# Patient Record
Sex: FEMALE | Race: WHITE | NOT HISPANIC OR LATINO | Employment: OTHER | URBAN - METROPOLITAN AREA
[De-identification: names, ages, dates, MRNs, and addresses within clinical notes are randomized per-mention and may not be internally consistent; named-entity substitution may affect disease eponyms.]

---

## 2017-04-06 DIAGNOSIS — M25.512 LEFT SHOULDER PAIN, UNSPECIFIED CHRONICITY: Primary | ICD-10-CM

## 2017-04-07 ENCOUNTER — HOSPITAL ENCOUNTER (OUTPATIENT)
Dept: RADIOLOGY | Facility: HOSPITAL | Age: 67
Discharge: HOME | End: 2017-04-07
Attending: ORTHOPAEDIC SURGERY
Payer: MEDICARE

## 2017-04-07 ENCOUNTER — OFFICE VISIT (OUTPATIENT)
Dept: ORTHOPEDICS | Facility: CLINIC | Age: 67
End: 2017-04-07
Payer: MEDICARE

## 2017-04-07 VITALS
WEIGHT: 196.19 LBS | DIASTOLIC BLOOD PRESSURE: 75 MMHG | HEART RATE: 90 BPM | BODY MASS INDEX: 32.69 KG/M2 | SYSTOLIC BLOOD PRESSURE: 147 MMHG | HEIGHT: 65 IN

## 2017-04-07 DIAGNOSIS — S46.002A ROTATOR CUFF INJURY, LEFT, INITIAL ENCOUNTER: Primary | ICD-10-CM

## 2017-04-07 DIAGNOSIS — M25.512 LEFT SHOULDER PAIN, UNSPECIFIED CHRONICITY: ICD-10-CM

## 2017-04-07 DIAGNOSIS — M25.512 LEFT SHOULDER PAIN, UNSPECIFIED CHRONICITY: Primary | ICD-10-CM

## 2017-04-07 PROCEDURE — 99999 PR PBB SHADOW E&M-EST. PATIENT-LVL III: CPT | Mod: PBBFAC,,, | Performed by: ORTHOPAEDIC SURGERY

## 2017-04-07 PROCEDURE — 99203 OFFICE O/P NEW LOW 30 MIN: CPT | Mod: S$PBB,,, | Performed by: ORTHOPAEDIC SURGERY

## 2017-04-07 NOTE — PROGRESS NOTES
History reviewed. No pertinent past medical history.    History reviewed. No pertinent surgical history.    No current outpatient prescriptions on file.     No current facility-administered medications for this visit.        Review of patient's allergies indicates:  No Known Allergies    History reviewed. No pertinent family history.    Social History     Social History    Marital status:      Spouse name: N/A    Number of children: N/A    Years of education: N/A     Occupational History    Not on file.     Social History Main Topics    Smoking status: Never Smoker    Smokeless tobacco: Never Used    Alcohol use Not on file    Drug use: No    Sexual activity: Not on file     Other Topics Concern    Not on file     Social History Narrative    No narrative on file       Chief Complaint:   Chief Complaint   Patient presents with    Left Shoulder - Pain       Consulting Physician: Self, Aaareferral    History of present illness:    This is a 66 y.o. year old female who complains of left shoulder pain following an injury on 2/26/17 while pulling a picnic table.  She reports she did not feel a pop at the time but the next day her shoulder was very sore.  Since that point in time she is unable to do overhead activities.  She puts her pain is much as a 7 out of 10 and worse with activities.  She is right-hand dominant.  She had a previous right rotator cuff repair.  She had a steroid injection from her primary care which was helpful in relieving her pain.  She reports that she has had some numbness and tingling in the arm since the injury.  She has been doing physical therapy and states that is helping.    Review of Systems:    Constitution: Denies chills, fever, and sweats.  HENT: Denies headaches or blurry vision.  Cardiovascular: Denies chest pain or irregular heart beat.  Respiratory: Denies cough or shortness of breath.  Gastrointestinal: Denies abdominal pain, nausea, or vomiting.  Musculoskeletal:   "Denies muscle cramps.  Neurological: Denies dizziness or focal weakness.  Psychiatric/Behavioral: Normal mental status.  Hematologic/Lymphatic: Denies bleeding problem or easy bruising/bleeding.  Skin: Denies rash or suspicious lesions.    Examination:    Vital Signs:    Vitals:    04/07/17 1451   BP: (!) 147/75   Pulse: 90   Weight: 89 kg (196 lb 3.4 oz)   Height: 5' 5" (1.651 m)   PainSc: 0-No pain   PainLoc: Shoulder       Body mass index is 32.65 kg/(m^2).    This a well-developed, well nourished patient in no acute distress.    Alert and oriented and cooperative to examination.       Physical Exam: Left Shoulder Exam     Skin  Rash:   None  Scars:   None    Inspection/Observation   Swelling:   none  Erythema:   none  Bruising:   none  Wounds:   none  Scapular Winging:  none  Scapular Dyskinesia:  none  Atrophy:   none  Masses:   None  Lymphadenopathy: None    Tenderness   AC Joint:   mild    Range of Motion   Active Forward Flexion:  60  Active External Rotation:  30  Active Internal Rotation:  HP    Passive Forward Flexion:  160  Passive External Rotation:  45    Muscle Strength   Forward Flexion:  4/5  External Rotation:  4/5  Internal Rotation:  4/5    Tests & Signs   Cross Arm:   positive  Impingement:   positive    Other   Sensation:   normal  Pulse:    2+ radial          Imaging: X-rays ordered and reviewed today of the left shoulder show some degenerative changes of the acromioclavicular joint.        Assessment: Rotator cuff injury, left, initial encounter        Plan:  Based on her exam and is likely that she has a rotator cuff tear.  I like to obtain an MRI.  She'll likely require a cuff repair and probable distal clavicle excision.  In the meantime we will keep her in physical therapy.      DISCLAIMER: This note may have been dictated using voice recognition software and may contain grammatical errors.     NOTE: Consult report sent to referring provider via [facility] EMR.  "

## 2017-04-11 RX ORDER — LORAZEPAM 1 MG/1
1 TABLET ORAL DAILY
Qty: 2 TABLET | Refills: 0 | Status: SHIPPED | OUTPATIENT
Start: 2017-04-11 | End: 2017-05-11

## 2017-04-15 ENCOUNTER — HOSPITAL ENCOUNTER (OUTPATIENT)
Dept: RADIOLOGY | Facility: HOSPITAL | Age: 67
Discharge: HOME | End: 2017-04-15
Attending: ORTHOPAEDIC SURGERY
Payer: MEDICARE

## 2017-04-15 DIAGNOSIS — M25.512 LEFT SHOULDER PAIN, UNSPECIFIED CHRONICITY: ICD-10-CM

## 2017-04-15 PROCEDURE — 73221 MRI JOINT UPR EXTREM W/O DYE: CPT | Mod: 26,LT,, | Performed by: RADIOLOGY

## 2017-04-15 PROCEDURE — 73221 MRI JOINT UPR EXTREM W/O DYE: CPT | Mod: TC,LT

## 2017-04-17 ENCOUNTER — OFFICE VISIT (OUTPATIENT)
Dept: ORTHOPEDICS | Facility: CLINIC | Age: 67
End: 2017-04-17
Payer: MEDICARE

## 2017-04-17 VITALS
DIASTOLIC BLOOD PRESSURE: 90 MMHG | BODY MASS INDEX: 32.65 KG/M2 | HEART RATE: 86 BPM | SYSTOLIC BLOOD PRESSURE: 153 MMHG | HEIGHT: 65 IN | WEIGHT: 196 LBS

## 2017-04-17 DIAGNOSIS — S46.002D ROTATOR CUFF INJURY, LEFT, SUBSEQUENT ENCOUNTER: Primary | ICD-10-CM

## 2017-04-17 PROCEDURE — 99213 OFFICE O/P EST LOW 20 MIN: CPT | Mod: S$GLB,,, | Performed by: ORTHOPAEDIC SURGERY

## 2017-04-17 NOTE — PROGRESS NOTES
History reviewed. No pertinent past medical history.    History reviewed. No pertinent surgical history.    Current Outpatient Prescriptions   Medication Sig    lorazepam (ATIVAN) 1 MG tablet Take 1 tablet (1 mg total) by mouth once daily.     No current facility-administered medications for this visit.        Review of patient's allergies indicates:  No Known Allergies    History reviewed. No pertinent family history.    Social History     Social History    Marital status:      Spouse name: N/A    Number of children: N/A    Years of education: N/A     Occupational History    Not on file.     Social History Main Topics    Smoking status: Never Smoker    Smokeless tobacco: Never Used    Alcohol use Not on file    Drug use: No    Sexual activity: Not on file     Other Topics Concern    Not on file     Social History Narrative       Chief Complaint:   Chief Complaint   Patient presents with    Shoulder Injury     DOI 2/26/17 LEFT ROTATOR CUFF INJURY/ F/U MRI       Consulting Physician: No ref. provider found    History of present illness:    This is a 66 y.o. year old female who complains of left shoulder pain following an injury on 2/26/17 while pulling a picnic table.  She reports she did not feel a pop at the time but the next day her shoulder was very sore.  Since that point in time she is unable to do overhead activities.  She puts her pain at a 5/10 today.    Review of Systems:    Constitution: Denies chills, fever, and sweats.  HENT: Denies headaches or blurry vision.  Cardiovascular: Denies chest pain or irregular heart beat.  Respiratory: Denies cough or shortness of breath.  Gastrointestinal: Denies abdominal pain, nausea, or vomiting.  Musculoskeletal:  Denies muscle cramps.  Neurological: Denies dizziness or focal weakness.  Psychiatric/Behavioral: Normal mental status.  Hematologic/Lymphatic: Denies bleeding problem or easy bruising/bleeding.  Skin: Denies rash or suspicious  "lesions.    Examination:    Vital Signs:    Vitals:    04/17/17 1345   BP: (!) 153/90   Pulse: 86   Weight: 88.9 kg (196 lb)   Height: 5' 5" (1.651 m)   PainSc:   5   PainLoc: Shoulder       Body mass index is 32.62 kg/(m^2).    This a well-developed, well nourished patient in no acute distress.    Alert and oriented and cooperative to examination.       Physical Exam: Left Shoulder Exam     Skin  Rash:   None  Scars:   None    Inspection/Observation   Swelling:   none  Erythema:   none  Bruising:   none  Wounds:   none  Scapular Winging:  none  Scapular Dyskinesia:  none  Atrophy:   none  Masses:   None  Lymphadenopathy: None    Tenderness   AC Joint:   mild    Range of Motion   Active Forward Flexion:  60  Active External Rotation:  30  Active Internal Rotation:  HP    Passive Forward Flexion:  160  Passive External Rotation:  45    Muscle Strength   Forward Flexion:  4/5  External Rotation:  4/5  Internal Rotation:  4/5    Tests & Signs   Cross Arm:   positive  Impingement:   positive    Other   Sensation:   normal  Pulse:    2+ radial          Imaging: The MRI of her left shoulder reveals a small full-thickness/high-grade partial-thickness tear of the supraspinatus.     Assessment: left cuff tear    Plan: We discussed treatment options and at this point in time she would like to have this fixed however she would like to wait until August for the surgery.  In the meantime we'll get her set up for physical therapy.  We'll plan to see her back in July and get her set up for surgery on August 17.    DISCLAIMER: This note may have been dictated using voice recognition software and may contain grammatical errors.     NOTE: Consult report sent to referring provider via [facility] EMR.  "

## 2017-07-24 ENCOUNTER — OFFICE VISIT (OUTPATIENT)
Dept: ORTHOPEDICS | Facility: CLINIC | Age: 67
End: 2017-07-24
Payer: MEDICARE

## 2017-07-24 VITALS
SYSTOLIC BLOOD PRESSURE: 138 MMHG | DIASTOLIC BLOOD PRESSURE: 74 MMHG | WEIGHT: 196 LBS | HEIGHT: 65 IN | HEART RATE: 97 BPM | BODY MASS INDEX: 32.65 KG/M2

## 2017-07-24 DIAGNOSIS — S46.002D ROTATOR CUFF INJURY, LEFT, SUBSEQUENT ENCOUNTER: Primary | ICD-10-CM

## 2017-07-24 PROCEDURE — 1125F AMNT PAIN NOTED PAIN PRSNT: CPT | Mod: S$GLB,,, | Performed by: ORTHOPAEDIC SURGERY

## 2017-07-24 PROCEDURE — 99213 OFFICE O/P EST LOW 20 MIN: CPT | Mod: S$GLB,,, | Performed by: ORTHOPAEDIC SURGERY

## 2017-07-24 PROCEDURE — 1159F MED LIST DOCD IN RCRD: CPT | Mod: S$GLB,,, | Performed by: ORTHOPAEDIC SURGERY

## 2022-12-07 ENCOUNTER — OFFICE VISIT (OUTPATIENT)
Dept: URGENT CARE | Facility: CLINIC | Age: 72
End: 2022-12-07
Payer: MEDICARE

## 2022-12-07 VITALS
HEIGHT: 64 IN | HEART RATE: 59 BPM | OXYGEN SATURATION: 95 % | TEMPERATURE: 100 F | WEIGHT: 200 LBS | DIASTOLIC BLOOD PRESSURE: 62 MMHG | BODY MASS INDEX: 34.15 KG/M2 | SYSTOLIC BLOOD PRESSURE: 126 MMHG

## 2022-12-07 DIAGNOSIS — U07.1 COVID-19 VIRUS DETECTED: ICD-10-CM

## 2022-12-07 DIAGNOSIS — U07.1 COVID: Primary | ICD-10-CM

## 2022-12-07 DIAGNOSIS — R52 BODY ACHES: ICD-10-CM

## 2022-12-07 LAB
CTP QC/QA: YES
MOLECULAR STREP A: NEGATIVE
POC MOLECULAR INFLUENZA A AGN: NEGATIVE
POC MOLECULAR INFLUENZA B AGN: NEGATIVE
SARS-COV-2 RDRP RESP QL NAA+PROBE: POSITIVE

## 2022-12-07 PROCEDURE — 87651 STREP A DNA AMP PROBE: CPT | Mod: QW,S$GLB,, | Performed by: NURSE PRACTITIONER

## 2022-12-07 PROCEDURE — 87635 SARS-COV-2 COVID-19 AMP PRB: CPT | Mod: QW,CR,S$GLB, | Performed by: NURSE PRACTITIONER

## 2022-12-07 PROCEDURE — 99203 PR OFFICE/OUTPT VISIT, NEW, LEVL III, 30-44 MIN: ICD-10-PCS | Mod: CR,S$GLB,, | Performed by: NURSE PRACTITIONER

## 2022-12-07 PROCEDURE — 99203 OFFICE O/P NEW LOW 30 MIN: CPT | Mod: CR,S$GLB,, | Performed by: NURSE PRACTITIONER

## 2022-12-07 PROCEDURE — 87651 POCT STREP A MOLECULAR: ICD-10-PCS | Mod: QW,S$GLB,, | Performed by: NURSE PRACTITIONER

## 2022-12-07 PROCEDURE — 87502 POCT INFLUENZA A/B MOLECULAR: ICD-10-PCS | Mod: QW,S$GLB,, | Performed by: NURSE PRACTITIONER

## 2022-12-07 PROCEDURE — 87502 INFLUENZA DNA AMP PROBE: CPT | Mod: QW,S$GLB,, | Performed by: NURSE PRACTITIONER

## 2022-12-07 PROCEDURE — 87635: ICD-10-PCS | Mod: QW,CR,S$GLB, | Performed by: NURSE PRACTITIONER

## 2022-12-07 RX ORDER — PROMETHAZINE HYDROCHLORIDE AND DEXTROMETHORPHAN HYDROBROMIDE 6.25; 15 MG/5ML; MG/5ML
5 SYRUP ORAL
Qty: 118 ML | Refills: 0 | Status: SHIPPED | OUTPATIENT
Start: 2022-12-07 | End: 2022-12-17

## 2022-12-07 RX ORDER — AMIODARONE HYDROCHLORIDE 100 MG/1
TABLET ORAL DAILY
COMMUNITY

## 2022-12-07 NOTE — PROGRESS NOTES
"Subjective:       Patient ID: Cruz King is a 72 y.o. female.    Vitals:  height is 5' 4" (1.626 m) and weight is 90.7 kg (200 lb). Her oral temperature is 99.7 °F (37.6 °C). Her blood pressure is 126/62 and her pulse is 59 (abnormal). Her oxygen saturation is 95%.     Chief Complaint: Sore Throat    This is a 72 y.o. female who presents today with a chief complaint of sore throat and body aches since yesterday.    Sore Throat   This is a new problem. The current episode started in the past 7 days. The problem has been gradually worsening. Associated symptoms include congestion, coughing, ear pain, headaches, a hoarse voice, a plugged ear sensation, shortness of breath, swollen glands and trouble swallowing. Pertinent negatives include no vomiting. Treatments tried: theraflu. The treatment provided no relief.     HENT:  Positive for ear pain, congestion, sore throat and trouble swallowing.    Respiratory:  Positive for cough and shortness of breath.    Gastrointestinal:  Negative for vomiting.   Neurological:  Positive for headaches.     Objective:      Physical Exam   Constitutional: She is cooperative. normal  HENT:   Head: Normocephalic and atraumatic.   Ears:   Right Ear: Hearing, tympanic membrane, external ear and ear canal normal.   Left Ear: Hearing, tympanic membrane, external ear and ear canal normal.   Nose: Nose normal.   Mouth/Throat: Uvula is midline, oropharynx is clear and moist and mucous membranes are normal. Mucous membranes are moist. Oropharynx is clear.   Eyes: Conjunctivae and lids are normal. Pupils are equal, round, and reactive to light. Extraocular movement intact   Neck: Neck supple.   Cardiovascular: Normal rate, regular rhythm, normal heart sounds and normal pulses.   Pulmonary/Chest: Effort normal and breath sounds normal.   Abdominal: Normal appearance.   Musculoskeletal: Normal range of motion.         General: Normal range of motion.   Lymphadenopathy:     She has no " cervical adenopathy.   Neurological: She is alert.   Skin: Skin is warm, dry and intact.   Psychiatric: Her behavior is normal. Mood normal.       Results for orders placed or performed in visit on 12/07/22   POCT Strep A, Molecular   Result Value Ref Range    Molecular Strep A, POC Negative Negative     Acceptable Yes    POCT Influenza A/B MOLECULAR   Result Value Ref Range    POC Molecular Influenza A Ag Negative Negative, Not Reported    POC Molecular Influenza B Ag Negative Negative, Not Reported     Acceptable Yes    POCT COVID-19 Rapid Screening   Result Value Ref Range    POC Rapid COVID Positive (A) Negative     Acceptable Yes     No results found.     Assessment:       1. COVID    2. Body aches          Plan:         COVID    Body aches  -     POCT Strep A, Molecular  -     POCT Influenza A/B MOLECULAR  -     POCT COVID-19 Rapid Screening

## 2022-12-12 ENCOUNTER — NURSE TRIAGE (OUTPATIENT)
Dept: ADMINISTRATIVE | Facility: CLINIC | Age: 72
End: 2022-12-12
Payer: COMMERCIAL

## 2022-12-12 NOTE — TELEPHONE ENCOUNTER
Pt contacted through Covid Symptom tracking for an escalation of symptoms. Pt states she tested positive 5 days ago and she is still positive. Pt advised repeat testing is not generally recommended as people can continue to test positive up to 90 days. Advised it is recommended to follow the CDC guidelines and progress through stages if criteria are met. Pt aslo asking what she and her  should do about requiring masks at [facility]. Advised to make community aware that there has been positives among the [employer] and either require masks if they want or recommend their usage and perhaps minimize hand shaking. 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 Advised ok to send out mail as CV does not live long on surfaces- recommend using wet paper towel instead of licking envelopes. Pt has no additional concerns or questions at this time. Advised to call back with concerns or worsening symptoms. Verbalized understanding.   Reason for Disposition   Information only question and nurse able to answer    Additional Information   Negative: Nursing judgment   Negative: Nursing judgment   Negative: Nursing judgment   Negative: Nursing judgment    Protocols used: Information Only Call - No Triage-A-[place]

## 2023-09-18 ENCOUNTER — OFFICE VISIT (OUTPATIENT)
Dept: URGENT CARE | Facility: CLINIC | Age: 73
End: 2023-09-18
Payer: MEDICARE

## 2023-09-18 VITALS
RESPIRATION RATE: 19 BRPM | SYSTOLIC BLOOD PRESSURE: 138 MMHG | OXYGEN SATURATION: 97 % | HEART RATE: 74 BPM | DIASTOLIC BLOOD PRESSURE: 67 MMHG | WEIGHT: 195 LBS | TEMPERATURE: 99 F | HEIGHT: 64 IN | BODY MASS INDEX: 33.29 KG/M2

## 2023-09-18 DIAGNOSIS — B96.89 ACUTE BACTERIAL SINUSITIS: Primary | ICD-10-CM

## 2023-09-18 DIAGNOSIS — J01.90 ACUTE BACTERIAL SINUSITIS: Primary | ICD-10-CM

## 2023-09-18 DIAGNOSIS — R05.9 COUGH, UNSPECIFIED TYPE: ICD-10-CM

## 2023-09-18 LAB
CTP QC/QA: YES
SARS-COV-2 AG RESP QL IA.RAPID: NEGATIVE

## 2023-09-18 PROCEDURE — 99213 PR OFFICE/OUTPT VISIT, EST, LEVL III, 20-29 MIN: ICD-10-PCS | Mod: S$GLB,,, | Performed by: NURSE PRACTITIONER

## 2023-09-18 PROCEDURE — 87811 SARS-COV-2 COVID19 W/OPTIC: CPT | Mod: QW,S$GLB,, | Performed by: NURSE PRACTITIONER

## 2023-09-18 PROCEDURE — 99213 OFFICE O/P EST LOW 20 MIN: CPT | Mod: S$GLB,,, | Performed by: NURSE PRACTITIONER

## 2023-09-18 PROCEDURE — 87811 SARS CORONAVIRUS 2 ANTIGEN POCT, MANUAL READ: ICD-10-PCS | Mod: QW,S$GLB,, | Performed by: NURSE PRACTITIONER

## 2023-09-18 RX ORDER — PROMETHAZINE HYDROCHLORIDE AND DEXTROMETHORPHAN HYDROBROMIDE 6.25; 15 MG/5ML; MG/5ML
5 SYRUP ORAL EVERY 6 HOURS PRN
Qty: 120 ML | Refills: 0 | Status: SHIPPED | OUTPATIENT
Start: 2023-09-18 | End: 2023-09-28

## 2023-09-18 RX ORDER — LOSARTAN POTASSIUM 25 MG/1
25 TABLET ORAL
COMMUNITY
Start: 2023-08-03

## 2023-09-18 RX ORDER — CEFDINIR 300 MG/1
300 CAPSULE ORAL 2 TIMES DAILY
Qty: 20 CAPSULE | Refills: 0 | Status: SHIPPED | OUTPATIENT
Start: 2023-09-18 | End: 2023-09-28

## 2023-09-18 NOTE — PROGRESS NOTES
"Subjective:       Patient ID: Cruz King is a 72 y.o. female.    Vitals:  height is 5' 4" (1.626 m) and weight is 88.5 kg (195 lb). Her oral temperature is 99 °F (37.2 °C). Her blood pressure is 138/67 and her pulse is 74. Her respiration is 19 and oxygen saturation is 97%.     Chief Complaint: Cough (Sore throat, cough, and body aches since yesterday. Patient stated that her ribs hurt from constantly coughing.)    This is a 72 y.o. female who presents today with a chief complaint of cough.     Patient presents with:  Cough: Sore throat, cough, nasal congestion and body aches over the past two days. Pt reports a worsening over the past 24 hours with increase in sinus pain. Patient denied having fever or SOB.         Cough  This is a new problem. The current episode started yesterday. The problem has been gradually worsening. The cough is Productive of sputum. Associated symptoms include chills, nasal congestion, postnasal drip, a sore throat, sweats and wheezing. Pertinent negatives include no shortness of breath. She has tried OTC cough suppressant for the symptoms. The treatment provided no relief. Her past medical history is significant for bronchitis.       Constitution: Positive for chills.   HENT:  Positive for postnasal drip, sinus pain, sinus pressure and sore throat.    Respiratory:  Positive for cough and wheezing. Negative for shortness of breath.    Musculoskeletal: Negative.    Neurological:  Negative for disorientation and altered mental status.   Psychiatric/Behavioral:  Negative for altered mental status, disorientation and confusion.            Objective:      Physical Exam   Constitutional: She is oriented to person, place, and time. She appears well-developed. She is cooperative.  Non-toxic appearance. She does not appear ill. No distress.   HENT:   Head: Normocephalic and atraumatic.   Ears:   Right Ear: Hearing, tympanic membrane, external ear and ear canal normal.   Left Ear: Hearing, " tympanic membrane, external ear and ear canal normal.   Nose: No mucosal edema, rhinorrhea or nasal deformity. No epistaxis. Right sinus exhibits maxillary sinus tenderness. Right sinus exhibits no frontal sinus tenderness. Left sinus exhibits maxillary sinus tenderness. Left sinus exhibits no frontal sinus tenderness.   Mouth/Throat: Uvula is midline and mucous membranes are normal. No trismus in the jaw. Normal dentition. No uvula swelling. Posterior oropharyngeal erythema present. No oropharyngeal exudate or posterior oropharyngeal edema.   Eyes: Conjunctivae and lids are normal. No scleral icterus.   Neck: Trachea normal and phonation normal. Neck supple. No edema present. No erythema present. No neck rigidity present.   Cardiovascular: Normal rate, regular rhythm, normal heart sounds and normal pulses.   Pulmonary/Chest: Effort normal and breath sounds normal. No respiratory distress. She has no decreased breath sounds. She has no rhonchi.   Abdominal: Normal appearance.   Musculoskeletal: Normal range of motion.         General: No deformity. Normal range of motion.   Lymphadenopathy:     She has cervical adenopathy.        Right cervical: Superficial cervical adenopathy present.        Left cervical: Superficial cervical adenopathy present.   Neurological: She is alert and oriented to person, place, and time. She exhibits normal muscle tone. Coordination normal.   Skin: Skin is warm, dry, intact, not diaphoretic and not pale.   Psychiatric: Her speech is normal and behavior is normal. Judgment and thought content normal.   Nursing note and vitals reviewed.        Past medical history and current medications reviewed.     Results for orders placed or performed in visit on 09/18/23   SARS Coronavirus 2 Antigen, POCT Manual Read   Result Value Ref Range    SARS Coronavirus 2 Antigen Negative Negative     Acceptable Yes       Assessment:           1. Acute bacterial sinusitis    2. Cough,  unspecified type              Plan:         Acute bacterial sinusitis  -     cefdinir (OMNICEF) 300 MG capsule; Take 1 capsule (300 mg total) by mouth 2 (two) times daily. for 10 days  Dispense: 20 capsule; Refill: 0    Cough, unspecified type  -     SARS Coronavirus 2 Antigen, POCT Manual Read  -     promethazine-dextromethorphan (PROMETHAZINE-DM) 6.25-15 mg/5 mL Syrp; Take 5 mLs by mouth every 6 (six) hours as needed (cough).  Dispense: 120 mL; Refill: 0             Patient Instructions   Please return here or go to the Emergency Department for any concerns or worsening of condition.  Please drink plenty of fluids.  Please get plenty of rest.  If you were prescribed antibiotics, please take them to completion.  If you were given wait & see antibiotics, please wait 5-7 days before taking them, and only take them if your symptoms have worsened or not improved.  If you do begin taking the antibiotics, please take them to completion.  If you were given a steroid shot in the clinic and have also been given a prescription for a steroid such as Prednisone or a Medrol Dose Pack, please begin taking them tomorrow.  If you do not have Hypertension or any history of palpitations, it is ok to take over the counter Sudafed or Mucinex D or Allegra-D or Claritin-D or Zyrtec-D.  If you do take one of the above, it is ok to combine that with plain over the counter Mucinex or Allegra or Claritin or Zyrtec.  If for example you are taking Zyrtec -D, you can combine that with Mucinex, but not Mucinex-D.  If you are taking Mucinex-D, you can combine that with plain Allegra or Claritin or Zyrtec.   If you do have Hypertension or palpitations, it is safe to take Coricidin HBP for relief of sinus symptoms.  If not allergic, please take over the counter Tylenol (Acetaminophen) and/or Motrin (Ibuprofen) as directed for control of pain and/or fever.  Please follow up with your primary care doctor or specialist as needed.    If you  smoke,  please stop smoking.         Giuseppe Hunt, J LUISP-C

## 2024-11-13 ENCOUNTER — OFFICE VISIT (OUTPATIENT)
Dept: URGENT CARE | Facility: CLINIC | Age: 74
End: 2024-11-13
Payer: MEDICARE

## 2024-11-13 VITALS
HEART RATE: 95 BPM | OXYGEN SATURATION: 97 % | RESPIRATION RATE: 17 BRPM | DIASTOLIC BLOOD PRESSURE: 86 MMHG | WEIGHT: 200 LBS | SYSTOLIC BLOOD PRESSURE: 126 MMHG | BODY MASS INDEX: 34.15 KG/M2 | TEMPERATURE: 98 F | HEIGHT: 64 IN

## 2024-11-13 DIAGNOSIS — R05.9 COUGH, UNSPECIFIED TYPE: ICD-10-CM

## 2024-11-13 DIAGNOSIS — J98.8 VIRAL RESPIRATORY ILLNESS: Primary | ICD-10-CM

## 2024-11-13 DIAGNOSIS — B97.89 VIRAL RESPIRATORY ILLNESS: Primary | ICD-10-CM

## 2024-11-13 PROCEDURE — 99214 OFFICE O/P EST MOD 30 MIN: CPT | Mod: S$GLB,,,

## 2024-11-13 RX ORDER — TRAZODONE HYDROCHLORIDE 50 MG/1
25 TABLET ORAL
COMMUNITY
Start: 2023-07-06

## 2024-11-13 RX ORDER — CELECOXIB 200 MG/1
200 CAPSULE ORAL
COMMUNITY

## 2024-11-13 RX ORDER — SEMAGLUTIDE 1.34 MG/ML
INJECTION, SOLUTION SUBCUTANEOUS
COMMUNITY

## 2024-11-13 RX ORDER — PROMETHAZINE HYDROCHLORIDE AND DEXTROMETHORPHAN HYDROBROMIDE 6.25; 15 MG/5ML; MG/5ML
5 SYRUP ORAL EVERY 6 HOURS PRN
Qty: 120 ML | Refills: 0 | Status: SHIPPED | OUTPATIENT
Start: 2024-11-13

## 2024-11-13 RX ORDER — MIRABEGRON 25 MG/1
25 TABLET, FILM COATED, EXTENDED RELEASE ORAL
COMMUNITY

## 2024-11-13 RX ORDER — LANSOPRAZOLE 30 MG/1
30 CAPSULE, DELAYED RELEASE ORAL
COMMUNITY
Start: 2023-12-03

## 2024-11-13 RX ORDER — FLUTICASONE PROPIONATE 50 MCG
1 SPRAY, SUSPENSION (ML) NASAL DAILY
Qty: 9.9 ML | Refills: 0 | Status: SHIPPED | OUTPATIENT
Start: 2024-11-13

## 2024-11-13 RX ORDER — BENZONATATE 100 MG/1
100 CAPSULE ORAL 3 TIMES DAILY PRN
Qty: 30 CAPSULE | Refills: 0 | Status: SHIPPED | OUTPATIENT
Start: 2024-11-13 | End: 2024-11-23

## 2024-11-13 RX ORDER — AMIODARONE HYDROCHLORIDE 200 MG/1
TABLET ORAL
COMMUNITY
Start: 2022-10-17

## 2024-11-13 RX ORDER — FUROSEMIDE 20 MG/1
20 TABLET ORAL
COMMUNITY

## 2024-11-13 RX ORDER — VALSARTAN 40 MG/1
40 TABLET ORAL
COMMUNITY
Start: 2024-10-10

## 2024-11-13 RX ORDER — DESMOPRESSIN ACETATE 0.2 MG/1
0.2 TABLET ORAL
COMMUNITY
Start: 2024-02-15

## 2024-11-13 NOTE — PROGRESS NOTES
"Subjective:      Patient ID: Cruz King is a 74 y.o. female.    Vitals:  height is 5' 4" (1.626 m) and weight is 90.7 kg (200 lb). Her oral temperature is 98.2 °F (36.8 °C). Her blood pressure is 162/70 (abnormal) and her pulse is 95. Her respiration is 17 and oxygen saturation is 97%.     Chief Complaint: Cough    This is a 74 y.o. female who presents today with a chief complaint of cough and sore throat x yesterday. Patient states she did an at-home COVID test this morning, and it was negative. Patient states she is going out of town today to [place], and she wants to get medications before going. Pt denies any chest pain or sob. Pt states that she has not been running any fever.         Cough  This is a new problem. The current episode started yesterday. The problem has been waxing and waning. The cough is Productive of sputum. Associated symptoms include headaches, nasal congestion, postnasal drip and a sore throat. Pertinent negatives include no fever. The symptoms are aggravated by lying down. Treatments tried: Chloraseptic. The treatment provided no relief. Her past medical history is significant for bronchitis.       Constitution: Negative. Negative for fever.   HENT:  Positive for congestion, postnasal drip, sinus pain, sinus pressure and sore throat.    Neck: neck negative.   Cardiovascular: Negative.    Eyes: Negative.    Respiratory:  Positive for cough.    Gastrointestinal: Negative.    Endocrine: negative.   Genitourinary: Negative.    Musculoskeletal: Negative.    Skin: Negative.    Allergic/Immunologic: Negative.    Neurological:  Positive for headaches.   Hematologic/Lymphatic: Negative.       Objective:     Physical Exam   Constitutional: She is oriented to person, place, and time.   HENT:   Head: Normocephalic and atraumatic.   Ears:   Right Ear: Tympanic membrane, external ear and ear canal normal.   Left Ear: Tympanic membrane, external ear and ear canal normal.   Nose: Congestion " present.   Mouth/Throat: Posterior oropharyngeal erythema present.   Eyes: Conjunctivae are normal. Pupils are equal, round, and reactive to light. Extraocular movement intact   Neck: Neck supple.   Cardiovascular: Normal rate, regular rhythm, normal heart sounds and normal pulses.   Pulmonary/Chest: Effort normal and breath sounds normal.   Abdominal: Normal appearance.   Musculoskeletal: Normal range of motion.         General: Normal range of motion.   Neurological: no focal deficit. She is alert, oriented to person, place, and time and at baseline.   Skin: Skin is warm.   Psychiatric: Her behavior is normal. Mood, judgment and thought content normal.   Nursing note and vitals reviewed.      Assessment:     1. Viral respiratory illness    2. Cough, unspecified type        Plan:       Viral respiratory illness  -     fluticasone propionate (FLONASE) 50 mcg/actuation nasal spray; 1 spray (50 mcg total) by Each Nostril route once daily.  Dispense: 9.9 mL; Refill: 0  -     promethazine-dextromethorphan (PROMETHAZINE-DM) 6.25-15 mg/5 mL Syrp; Take 5 mLs by mouth every 6 (six) hours as needed (cough).  Dispense: 120 mL; Refill: 0  -     benzonatate (TESSALON) 100 MG capsule; Take 1 capsule (100 mg total) by mouth 3 (three) times daily as needed for Cough.  Dispense: 30 capsule; Refill: 0    Cough, unspecified type  -     fluticasone propionate (FLONASE) 50 mcg/actuation nasal spray; 1 spray (50 mcg total) by Each Nostril route once daily.  Dispense: 9.9 mL; Refill: 0  -     promethazine-dextromethorphan (PROMETHAZINE-DM) 6.25-15 mg/5 mL Syrp; Take 5 mLs by mouth every 6 (six) hours as needed (cough).  Dispense: 120 mL; Refill: 0  -     benzonatate (TESSALON) 100 MG capsule; Take 1 capsule (100 mg total) by mouth 3 (three) times daily as needed for Cough.  Dispense: 30 capsule; Refill: 0

## 2024-12-24 ENCOUNTER — OFFICE VISIT (OUTPATIENT)
Dept: URGENT CARE | Facility: CLINIC | Age: 74
End: 2024-12-24
Payer: MEDICARE

## 2024-12-24 VITALS
SYSTOLIC BLOOD PRESSURE: 152 MMHG | OXYGEN SATURATION: 98 % | HEART RATE: 99 BPM | HEIGHT: 64 IN | TEMPERATURE: 99 F | RESPIRATION RATE: 17 BRPM | BODY MASS INDEX: 34.15 KG/M2 | DIASTOLIC BLOOD PRESSURE: 84 MMHG | WEIGHT: 200 LBS

## 2024-12-24 DIAGNOSIS — J32.9 BACTERIAL SINUSITIS: Primary | ICD-10-CM

## 2024-12-24 DIAGNOSIS — R05.9 COUGH, UNSPECIFIED TYPE: ICD-10-CM

## 2024-12-24 DIAGNOSIS — B96.89 BACTERIAL SINUSITIS: Primary | ICD-10-CM

## 2024-12-24 LAB
CTP QC/QA: YES
CTP QC/QA: YES
POC MOLECULAR INFLUENZA A AGN: NEGATIVE
POC MOLECULAR INFLUENZA B AGN: NEGATIVE
SARS-COV-2 AG RESP QL IA.RAPID: NEGATIVE

## 2024-12-24 PROCEDURE — 87502 INFLUENZA DNA AMP PROBE: CPT | Mod: QW,,, | Performed by: NURSE PRACTITIONER

## 2024-12-24 PROCEDURE — 87811 SARS-COV-2 COVID19 W/OPTIC: CPT | Mod: QW,S$GLB,, | Performed by: NURSE PRACTITIONER

## 2024-12-24 PROCEDURE — 99214 OFFICE O/P EST MOD 30 MIN: CPT | Mod: S$GLB,,, | Performed by: NURSE PRACTITIONER

## 2024-12-24 RX ORDER — PROMETHAZINE HYDROCHLORIDE AND DEXTROMETHORPHAN HYDROBROMIDE 6.25; 15 MG/5ML; MG/5ML
5 SYRUP ORAL EVERY 4 HOURS PRN
Qty: 118 ML | Refills: 0 | Status: SHIPPED | OUTPATIENT
Start: 2024-12-24 | End: 2025-01-03

## 2024-12-24 RX ORDER — AMOXICILLIN 500 MG/1
500 CAPSULE ORAL EVERY 8 HOURS
Qty: 30 CAPSULE | Refills: 0 | Status: SHIPPED | OUTPATIENT
Start: 2024-12-24 | End: 2025-01-03

## 2024-12-24 NOTE — PROGRESS NOTES
"Subjective:      Patient ID: Cruz King is a 74 y.o. female.    Vitals:  height is 5' 4" (1.626 m) and weight is 90.7 kg (200 lb). Her oral temperature is 98.8 °F (37.1 °C). Her blood pressure is 152/84 (abnormal) and her pulse is 99. Her respiration is 17 and oxygen saturation is 98%.     Chief Complaint: Cough    74 y.o. female who presents today with a chief complaint of cough, sore throat, sinus pressure/congestion, and chest congestion starting last night. Pt has not taken any medication.        Cough  This is a new problem. The current episode started yesterday. The problem has been unchanged. The cough is Productive of sputum. Associated symptoms include nasal congestion, postnasal drip and a sore throat. She has tried nothing for the symptoms.       HENT:  Positive for postnasal drip, sinus pressure and sore throat.    Respiratory:  Positive for cough.       Objective:     Physical Exam   Constitutional: She is oriented to person, place, and time.  Non-toxic appearance. She does not appear ill. No distress. obesity  HENT:   Head: Normocephalic and atraumatic.   Ears:   Right Ear: Tympanic membrane, external ear and ear canal normal.   Left Ear: Tympanic membrane, external ear and ear canal normal.   Nose: Congestion present. Right sinus exhibits maxillary sinus tenderness. Left sinus exhibits maxillary sinus tenderness.   Mouth/Throat: Mucous membranes are moist. No posterior oropharyngeal erythema. Oropharynx is clear.   Eyes: Conjunctivae are normal. Extraocular movement intact   Neck: Neck supple. No neck rigidity present.   Cardiovascular: Normal rate, regular rhythm, normal heart sounds and normal pulses.   Pulmonary/Chest: Effort normal and breath sounds normal.   Abdominal: Normal appearance.   Musculoskeletal: Normal range of motion.         General: Normal range of motion.      Cervical back: She exhibits no tenderness.   Lymphadenopathy:     She has no cervical adenopathy.   Neurological: " She is alert and oriented to person, place, and time.   Skin: Skin is warm, dry and not diaphoretic.   Psychiatric: Her behavior is normal.   Vitals reviewed.    Results for orders placed or performed in visit on 12/24/24   SARS Coronavirus 2 Antigen, POCT Manual Read    Collection Time: 12/24/24  8:56 AM   Result Value Ref Range    SARS Coronavirus 2 Antigen Negative Negative     Acceptable Yes    POCT Influenza A/B MOLECULAR    Collection Time: 12/24/24  8:56 AM   Result Value Ref Range    POC Molecular Influenza A Ag Negative Negative    POC Molecular Influenza B Ag Negative Negative     Acceptable Yes     No results found.     Assessment:     1. Bacterial sinusitis    2. Cough, unspecified type        Plan:       Bacterial sinusitis  -     amoxicillin (AMOXIL) 500 MG capsule; Take 1 capsule (500 mg total) by mouth every 8 (eight) hours. for 10 days  Dispense: 30 capsule; Refill: 0    Cough, unspecified type  -     SARS Coronavirus 2 Antigen, POCT Manual Read  -     POCT Influenza A/B MOLECULAR  -     promethazine-dextromethorphan (PROMETHAZINE-DM) 6.25-15 mg/5 mL Syrp; Take 5 mLs by mouth every 4 (four) hours as needed (cough).  Dispense: 118 mL; Refill: 0      INSTRUCTIONS  Meds as prescribed. Follow up as advised.

## 2025-03-08 ENCOUNTER — OFFICE VISIT (OUTPATIENT)
Dept: URGENT CARE | Facility: CLINIC | Age: 75
End: 2025-03-08
Payer: MEDICARE

## 2025-03-08 VITALS
DIASTOLIC BLOOD PRESSURE: 78 MMHG | SYSTOLIC BLOOD PRESSURE: 147 MMHG | OXYGEN SATURATION: 97 % | HEART RATE: 65 BPM | TEMPERATURE: 99 F | BODY MASS INDEX: 35.68 KG/M2 | HEIGHT: 64 IN | RESPIRATION RATE: 18 BRPM | WEIGHT: 209 LBS

## 2025-03-08 DIAGNOSIS — J98.8 VIRAL RESPIRATORY ILLNESS: Primary | ICD-10-CM

## 2025-03-08 DIAGNOSIS — J02.9 SORE THROAT: ICD-10-CM

## 2025-03-08 DIAGNOSIS — B97.89 VIRAL RESPIRATORY ILLNESS: Primary | ICD-10-CM

## 2025-03-08 DIAGNOSIS — R09.81 NASAL CONGESTION: ICD-10-CM

## 2025-03-08 DIAGNOSIS — R05.9 COUGH, UNSPECIFIED TYPE: ICD-10-CM

## 2025-03-08 PROBLEM — B00.1 HERPES LABIALIS: Status: ACTIVE | Noted: 2025-03-08

## 2025-03-08 PROBLEM — I48.0 PAROXYSMAL ATRIAL FIBRILLATION: Status: ACTIVE | Noted: 2025-03-08

## 2025-03-08 PROBLEM — E78.2 MIXED HYPERLIPIDEMIA: Status: ACTIVE | Noted: 2025-03-08

## 2025-03-08 PROBLEM — I10 HYPERTENSION: Status: ACTIVE | Noted: 2025-03-08

## 2025-03-08 PROBLEM — M54.16 LUMBAR RADICULOPATHY: Status: ACTIVE | Noted: 2025-03-08

## 2025-03-08 PROBLEM — K21.9 GASTROESOPHAGEAL REFLUX DISEASE: Status: ACTIVE | Noted: 2025-03-08

## 2025-03-08 PROBLEM — Z87.19 H/O CHRONIC HEPATITIS: Status: ACTIVE | Noted: 2025-03-08

## 2025-03-08 PROBLEM — E66.812 CLASS 2 SEVERE OBESITY DUE TO EXCESS CALORIES WITH SERIOUS COMORBIDITY AND BODY MASS INDEX (BMI) OF 36.0 TO 36.9 IN ADULT: Status: ACTIVE | Noted: 2025-03-08

## 2025-03-08 PROBLEM — A60.04 HERPETIC VULVOVAGINITIS: Status: ACTIVE | Noted: 2025-03-08

## 2025-03-08 PROBLEM — K58.0 IRRITABLE BOWEL SYNDROME WITH DIARRHEA: Status: ACTIVE | Noted: 2025-03-08

## 2025-03-08 PROBLEM — K64.0 GRADE I HEMORRHOIDS: Status: ACTIVE | Noted: 2025-03-08

## 2025-03-08 PROBLEM — R35.0 INCREASED FREQUENCY OF URINATION: Status: ACTIVE | Noted: 2025-03-08

## 2025-03-08 PROBLEM — Z86.0101 HISTORY OF ADENOMATOUS POLYP OF COLON: Status: ACTIVE | Noted: 2025-03-08

## 2025-03-08 PROBLEM — E11.9 TYPE 2 DIABETES MELLITUS: Status: ACTIVE | Noted: 2025-03-08

## 2025-03-08 PROBLEM — E66.01 CLASS 2 SEVERE OBESITY DUE TO EXCESS CALORIES WITH SERIOUS COMORBIDITY AND BODY MASS INDEX (BMI) OF 36.0 TO 36.9 IN ADULT: Status: ACTIVE | Noted: 2025-03-08

## 2025-03-08 PROBLEM — G47.33 OBSTRUCTIVE SLEEP APNEA SYNDROME: Status: ACTIVE | Noted: 2025-03-08

## 2025-03-08 LAB
CTP QC/QA: YES
MOLECULAR STREP A: NEGATIVE
POC MOLECULAR INFLUENZA A AGN: NEGATIVE
POC MOLECULAR INFLUENZA B AGN: NEGATIVE
SARS CORONAVIRUS 2 ANTIGEN: NEGATIVE

## 2025-03-08 PROCEDURE — 87811 SARS-COV-2 COVID19 W/OPTIC: CPT | Mod: QW,S$GLB,,

## 2025-03-08 PROCEDURE — 99214 OFFICE O/P EST MOD 30 MIN: CPT | Mod: S$GLB,,,

## 2025-03-08 PROCEDURE — 87651 STREP A DNA AMP PROBE: CPT | Mod: QW,,,

## 2025-03-08 PROCEDURE — 87502 INFLUENZA DNA AMP PROBE: CPT | Mod: QW,,,

## 2025-03-08 RX ORDER — SODIUM FLUORIDE 5 MG/G
CREAM DENTAL
COMMUNITY
Start: 2025-02-07

## 2025-03-08 RX ORDER — ACYCLOVIR 50 MG/G
OINTMENT TOPICAL
COMMUNITY
Start: 2024-11-27 | End: 2025-11-27

## 2025-03-08 RX ORDER — GABAPENTIN 100 MG/1
100 CAPSULE ORAL
COMMUNITY
Start: 2025-01-24

## 2025-03-08 RX ORDER — IPRATROPIUM BROMIDE 21 UG/1
2 SPRAY, METERED NASAL 2 TIMES DAILY
Qty: 30 ML | Refills: 0 | Status: SHIPPED | OUTPATIENT
Start: 2025-03-08

## 2025-03-08 RX ORDER — PROMETHAZINE HYDROCHLORIDE AND DEXTROMETHORPHAN HYDROBROMIDE 6.25; 15 MG/5ML; MG/5ML
5 SYRUP ORAL NIGHTLY PRN
Qty: 120 ML | Refills: 0 | Status: SHIPPED | OUTPATIENT
Start: 2025-03-08

## 2025-03-08 NOTE — PROGRESS NOTES
"Subjective:      Patient ID: Cruz King is a 74 y.o. female.    Vitals:  height is 5' 4" (1.626 m) and weight is 94.8 kg (209 lb). Her oral temperature is 98.5 °F (36.9 °C). Her blood pressure is 147/78 (abnormal) and her pulse is 65. Her respiration is 18 and oxygen saturation is 97%.     Chief Complaint: Cough (Symptoms started on 6 days ago. Symptoms are the following: cough dry, sore throat, sore in mouth, diarrhea, fatigue, headache, bodyaches, exposed to flu.symptoms treated with the following: amoxicillin, ibuprofen)    This is a 74 y.o. female who presents today with a chief complaint of Cough: Symptoms started on 6 days ago. Symptoms are the following: cough dry, sore throat, sore in mouth, diarrhea, fatigue, headache, bodyaches, exposed to flu.symptoms treated with the following: amoxicillin, ibuprofen  Patient presents with:  Cough: Symptoms started on 6 days ago. Symptoms are the following: cough dry, sore throat, sore in mouth, diarrhea, fatigue, headache, bodyaches, exposed to flu.symptoms treated with the following: amoxicillin, ibuprofen  Pt states that she was seen Wednesday by her pcp and dx with bronchitis and give amox for this.          Cough  This is a new problem. The current episode started in the past 7 days. The problem has been gradually worsening. The problem occurs constantly. The cough is Non-productive. Associated symptoms include headaches, nasal congestion, postnasal drip and a sore throat. Associated symptoms comments: Diarrhea, bodyaches, . Treatments tried: amoxicillin, ibuprofen. The treatment provided mild relief. Her past medical history is significant for bronchitis.       Constitution: Positive for fatigue.   HENT:  Positive for congestion, postnasal drip, sinus pain, sinus pressure and sore throat.    Neck: neck negative.   Cardiovascular: Negative.    Eyes: Negative.    Respiratory:  Positive for cough.    Gastrointestinal: Negative.    Endocrine: negative. "   Genitourinary: Negative.    Musculoskeletal: Negative.    Skin: Negative.    Allergic/Immunologic: Negative.    Neurological:  Positive for headaches.   Hematologic/Lymphatic: Negative.    Psychiatric/Behavioral: Negative.        Objective:     Physical Exam   Constitutional: She is oriented to person, place, and time. She is cooperative. She does not appear ill. No distress.   HENT:   Head: Normocephalic and atraumatic.   Ears:   Right Ear: Tympanic membrane, external ear and ear canal normal.   Left Ear: Tympanic membrane, external ear and ear canal normal.   Nose: Congestion present. Right sinus exhibits maxillary sinus tenderness and frontal sinus tenderness. Left sinus exhibits maxillary sinus tenderness and frontal sinus tenderness.   Mouth/Throat: Mucous membranes are moist. Posterior oropharyngeal erythema present.   Eyes: Conjunctivae are normal. Pupils are equal, round, and reactive to light. Extraocular movement intact   Neck: Neck supple. No neck rigidity present.   Cardiovascular: Normal rate, regular rhythm, normal heart sounds and normal pulses.   Pulmonary/Chest: Effort normal and breath sounds normal. No respiratory distress. She has no wheezes.   Abdominal: Normal appearance.   Musculoskeletal: Normal range of motion.         General: Normal range of motion.      Cervical back: She exhibits no tenderness.   Neurological: no focal deficit. She is alert, oriented to person, place, and time and at baseline.   Skin: Skin is warm and dry.   Psychiatric: Her behavior is normal. Mood, judgment and thought content normal.   Nursing note and vitals reviewed.      Assessment:     1. Viral respiratory illness    2. Nasal congestion    3. Sore throat    4. Cough, unspecified type        Plan:       Viral respiratory illness    Nasal congestion  -     SARS Coronavirus 2 Antigen, POCT Manual Read  -     POCT Influenza A/B MOLECULAR  -     ipratropium (ATROVENT) 21 mcg (0.03 %) nasal spray; 2 sprays by Each  Nostril route 2 (two) times daily.  Dispense: 30 mL; Refill: 0    Sore throat  -     POCT Strep A, Molecular    Cough, unspecified type  -     promethazine-dextromethorphan (PROMETHAZINE-DM) 6.25-15 mg/5 mL Syrp; Take 5 mLs by mouth nightly as needed (cough).  Dispense: 120 mL; Refill: 0